# Patient Record
Sex: FEMALE | Race: WHITE
[De-identification: names, ages, dates, MRNs, and addresses within clinical notes are randomized per-mention and may not be internally consistent; named-entity substitution may affect disease eponyms.]

---

## 2020-11-25 ENCOUNTER — HOSPITAL ENCOUNTER (EMERGENCY)
Dept: HOSPITAL 62 - ER | Age: 45
Discharge: HOME | End: 2020-11-25
Payer: COMMERCIAL

## 2020-11-25 VITALS — DIASTOLIC BLOOD PRESSURE: 73 MMHG | SYSTOLIC BLOOD PRESSURE: 131 MMHG

## 2020-11-25 DIAGNOSIS — I10: ICD-10-CM

## 2020-11-25 DIAGNOSIS — Z86.19: ICD-10-CM

## 2020-11-25 DIAGNOSIS — R07.89: ICD-10-CM

## 2020-11-25 DIAGNOSIS — J45.901: Primary | ICD-10-CM

## 2020-11-25 DIAGNOSIS — R06.02: ICD-10-CM

## 2020-11-25 LAB
ADD MANUAL DIFF: NO
ALBUMIN SERPL-MCNC: 4.5 G/DL (ref 3.5–5)
ALP SERPL-CCNC: 67 U/L (ref 38–126)
ANION GAP SERPL CALC-SCNC: 10 MMOL/L (ref 5–19)
APPEARANCE UR: (no result)
APTT PPP: YELLOW S
AST SERPL-CCNC: 18 U/L (ref 14–36)
BASOPHILS # BLD AUTO: 0 10^3/UL (ref 0–0.2)
BASOPHILS NFR BLD AUTO: 0.3 % (ref 0–2)
BILIRUB DIRECT SERPL-MCNC: 0.1 MG/DL (ref 0–0.4)
BILIRUB SERPL-MCNC: 0.4 MG/DL (ref 0.2–1.3)
BILIRUB UR QL STRIP: NEGATIVE
BUN SERPL-MCNC: 11 MG/DL (ref 7–20)
CALCIUM: 9.4 MG/DL (ref 8.4–10.2)
CHLORIDE SERPL-SCNC: 104 MMOL/L (ref 98–107)
CO2 SERPL-SCNC: 24 MMOL/L (ref 22–30)
EOSINOPHIL # BLD AUTO: 0.1 10^3/UL (ref 0–0.6)
EOSINOPHIL NFR BLD AUTO: 1.2 % (ref 0–6)
ERYTHROCYTE [DISTWIDTH] IN BLOOD BY AUTOMATED COUNT: 13.5 % (ref 11.5–14)
GLUCOSE SERPL-MCNC: 103 MG/DL (ref 75–110)
GLUCOSE UR STRIP-MCNC: NEGATIVE MG/DL
HCT VFR BLD CALC: 39.5 % (ref 36–47)
HGB BLD-MCNC: 13.3 G/DL (ref 12–15.5)
KETONES UR STRIP-MCNC: NEGATIVE MG/DL
LYMPHOCYTES # BLD AUTO: 4 10^3/UL (ref 0.5–4.7)
LYMPHOCYTES NFR BLD AUTO: 32.8 % (ref 13–45)
MCH RBC QN AUTO: 29.4 PG (ref 27–33.4)
MCHC RBC AUTO-ENTMCNC: 33.7 G/DL (ref 32–36)
MCV RBC AUTO: 87 FL (ref 80–97)
MONOCYTES # BLD AUTO: 1.1 10^3/UL (ref 0.1–1.4)
MONOCYTES NFR BLD AUTO: 8.6 % (ref 3–13)
NEUTROPHILS # BLD AUTO: 7 10^3/UL (ref 1.7–8.2)
NEUTS SEG NFR BLD AUTO: 57.1 % (ref 42–78)
NITRITE UR QL STRIP: NEGATIVE
PH UR STRIP: 5 [PH] (ref 5–9)
PLATELET # BLD: 371 10^3/UL (ref 150–450)
POTASSIUM SERPL-SCNC: 3.8 MMOL/L (ref 3.6–5)
PROT SERPL-MCNC: 7.5 G/DL (ref 6.3–8.2)
PROT UR STRIP-MCNC: NEGATIVE MG/DL
RBC # BLD AUTO: 4.53 10^6/UL (ref 3.72–5.28)
SP GR UR STRIP: 1.02
TOTAL CELLS COUNTED % (AUTO): 100 %
UROBILINOGEN UR-MCNC: NEGATIVE MG/DL (ref ?–2)
WBC # BLD AUTO: 12.3 10^3/UL (ref 4–10.5)

## 2020-11-25 PROCEDURE — 85025 COMPLETE CBC W/AUTO DIFF WBC: CPT

## 2020-11-25 PROCEDURE — 84703 CHORIONIC GONADOTROPIN ASSAY: CPT

## 2020-11-25 PROCEDURE — 81001 URINALYSIS AUTO W/SCOPE: CPT

## 2020-11-25 PROCEDURE — 85379 FIBRIN DEGRADATION QUANT: CPT

## 2020-11-25 PROCEDURE — 71045 X-RAY EXAM CHEST 1 VIEW: CPT

## 2020-11-25 PROCEDURE — 80053 COMPREHEN METABOLIC PANEL: CPT

## 2020-11-25 PROCEDURE — 84484 ASSAY OF TROPONIN QUANT: CPT

## 2020-11-25 PROCEDURE — 99284 EMERGENCY DEPT VISIT MOD MDM: CPT

## 2020-11-25 PROCEDURE — 94640 AIRWAY INHALATION TREATMENT: CPT

## 2020-11-25 PROCEDURE — 36415 COLL VENOUS BLD VENIPUNCTURE: CPT

## 2020-11-25 NOTE — RADIOLOGY REPORT (SQ)
EXAM DESCRIPTION:  CHEST SINGLE VIEW



IMAGES COMPLETED DATE/TIME:  11/25/2020 6:34 pm



REASON FOR STUDY:  sob, positive covid



COMPARISON:  AP view of the chest from 2/17/2011.



EXAM PARAMETERS:  NUMBER OF VIEWS: One view.

TECHNIQUE:  An AP view of the chest was obtained.

RADIATION DOSE: NA

LIMITATIONS: None.



FINDINGS:  LUNGS AND PLEURA: No consolidation, pleural effusion or pneumothorax.

MEDIASTINUM AND HILAR STRUCTURES: No mediastinal or hilar contour abnormality.

HEART AND VASCULAR STRUCTURES: The cardiac silhouette and pulmonary vasculature are within normal willett
its.

BONES: No acute findings.

HARDWARE: None in the chest.

OTHER: No other finding.



IMPRESSION:  No acute cardiopulmonary process.



TECHNICAL DOCUMENTATION:  JOB ID:  [id number]

 2011 [employer]- All Rights Reserved



Reading location - IP/workstation name: [email or web address]

## 2020-11-25 NOTE — ER DOCUMENT REPORT
ED General





- General


Chief Complaint: Chest Tightness


Stated Complaint: CHEST TIGHTNESS


Time Seen by Provider: 11/25/20 17:59


Primary Care Provider: 


CAM ROBERSON MD [EMERITUS] - Follow up as needed





- [facility]


Notes: 





Patient is a 45-year-old female who presents with chest tightness.  She states 

she was diagnosed with COVID-19 2 weeks ago.  She has just finished 

quarantining.  Did not need to be hospitalized.  States that since Sunday, she 

has felt chest tightness and shortness of breath.  She states she feels better 

when she takes a deep breath.  Symptoms are intermittent.  She does have asthma 

but does not usually need to use her inhaler.  She states that she used her 

inhaler on Sunday because of the chest tightness.  She denies cough.  No fever. 

 No chills, abdominal pain, nausea, vomiting.  She states her COVID-19 symptoms 

were nasal congestion and loss of taste and smell which has all resolved.





- Related Data


Allergies/Adverse Reactions: 


                                        





No Known Allergies Allergy (Verified 04/15/13 17:49)


   











Past Medical History





- General


Information source: Patient





- Social History


Smoking Status: Never Smoker


Chew tobacco use (# tins/day): No


Frequency of alcohol use: None


Drug Abuse: None


Family History: None





- Past Medical History


Cardiac Medical History: Reports: Hx Hypertension


Past Surgical History: Reports: Hx Cholecystectomy





- Immunizations


Immunizations up to date: Yes


Hx Diphtheria, Pertussis, Tetanus Vaccination:  - unk





Review of Systems





- Review of Systems


Notes: 


CONSTITUTIONAL:  No fever, fatigue or weight loss.  


SKIN:  No rash.  


HENT:  No congestion, ear pain, or sore throat.  


EYES:  No recent vision problems or eye pain.   


CARDIOVASCULAR:  No chest pain or edema.


RESPIRATORY:  No cough.  Positive for chest tightness and shortness of breath.


GASTROINTESTINAL:  No abdominal pain, nausea, vomiting, bloody stools or 

diarrhea.  


GENITOURINARY: No dysuria.  


MUSCULOSKELETAL:  No joint pain or swelling.  


LYMPHATIC: No swollen glands. 


NEUROLOGIC:  No seizures. No headache, focal weakness or sensory changes. 


HEMATOLOGIC:  No unusual bruising or bleeding.  


PSYCHIATRIC:  No depression or anxiety.





Physical Exam





- Vital signs


Vitals: 


                                        











Temp Pulse Resp BP Pulse Ox


 


 98.3 F   88   18   149/75 H  96 


 


 11/25/20 17:45  11/25/20 17:45  11/25/20 17:45  11/25/20 17:45  11/25/20 17:45














- General


General appearance: Appears well


Notes: 





VITAL SIGNS: Within normal limits.


GENERAL:  No acute distress, non-toxic appearance.  


HEAD:  Normal with no signs of head trauma.


EYES:  EOMI, conjunctiva normal, no discharge.  


EARS:  Hearing grossly intact.


NOSE: Normal.


NECK:  Normal range of motion, no tenderness, supple, no lymphadenopathy, No 

adenopathy, no JVD.   


CHEST:  Clear breath sounds bilaterally.  No wheezes, rales, or rhonchi.  


CARDIAC:  Regular rate and rhythm.  S1 and S2, without murmurs, gallops, or 

rubs.


VASCULAR:  No Edema. 


ABDOMEN: Normal and soft with no tenderness


MUSCULOSKELETAL:  Good range of motion of all major joints. Extremities without 

clubbing, cyanosis or edema.  


NEUROLOGICAL:  Alert and oriented x 3.  No focal sensory or strength deficits.  

 Speech normal.  Follows commands appropriately.


PSYCHIATRIC:  Normal Affect, judgement and mood.


SKIN:  Normal appearance with no rashes or lesions.





Course





- Re-evaluation


Re-evalutation: 





11/25/20 19:32


Patient appears well on exam.  She is anxious.  As she recently had Covid, I 

will rule out PE with a D-dimer as she is more susceptible to this.  I will also

 give her a breathing treatment.  Patient is 100% on room air.  Patient's D-

dimer is negative.  Her work-up is unremarkable.  She may be having an asthma 

exacerbation.  She was given prednisone and a DuoNeb.  Likely patient will be 

discharged home with a prednisone course.  She may continue to use her inhaler. 

 Patient should follow-up with her PCP.  She was given strict return prec

autions.  Patient has a phone appointment with her PCP already scheduled for 

Monday.


11/25/20 21:01





11/25/20 21:28








- Vital Signs


Vital signs: 


                                        











Temp Pulse Resp BP Pulse Ox


 


 98.3 F   88   14   148/76 H  98 


 


 11/25/20 19:33  11/25/20 17:45  11/25/20 20:01  11/25/20 20:01  11/25/20 20:01














- Laboratory


Result Diagrams: 


                                 11/25/20 18:12





                                 11/25/20 18:12


Laboratory results interpreted by me: 


                                        











  11/25/20





  18:12


 


WBC  12.3 H














- Diagnostic Test


Radiology reviewed: Image reviewed, Reports reviewed





- EKG Interpretation by Me


EKG shows normal: Sinus rhythm


Rate: Normal


Rhythm: NSR


When compared to previous EKG there are: Previous EKG unavailable





Discharge





- Discharge


Clinical Impression: 


 Chest tightness, Shortness of breath





Asthma exacerbation


Qualifiers:


 Asthma severity: mild Asthma persistence: unspecified Qualified Code(s): 

J45.901 - Unspecified asthma with (acute) exacerbation





Condition: Stable


Disposition: HOME, SELF-CARE


Instructions:  Asthma (OM), Chest Pain of Unclear Cause (OM)


Additional Instructions: 


Your work-up was reassuring.  Follow up with your family doctor.  Please return 

to the ER for any worsening symptoms


Prescriptions: 


Albuterol Sulfate [Albuterol Sulfate Hfa] 2 puff IH Q6H PRN #1 [id number].[id number].[id number]


 PRN Reason: 


Prednisone [Deltasone 20 mg Tablet] 40 mg PO DAILY 4 Days #8 tablet


Referrals: 


CAM ROBERSON MD [EMERITUS] - Follow up as needed